# Patient Record
Sex: FEMALE | Race: WHITE
[De-identification: names, ages, dates, MRNs, and addresses within clinical notes are randomized per-mention and may not be internally consistent; named-entity substitution may affect disease eponyms.]

---

## 2018-01-09 ENCOUNTER — HOSPITAL ENCOUNTER (EMERGENCY)
Dept: HOSPITAL 17 - PHED | Age: 43
Discharge: HOME | End: 2018-01-09
Payer: COMMERCIAL

## 2018-01-09 VITALS
TEMPERATURE: 98.1 F | RESPIRATION RATE: 16 BRPM | OXYGEN SATURATION: 98 % | HEART RATE: 64 BPM | SYSTOLIC BLOOD PRESSURE: 121 MMHG | DIASTOLIC BLOOD PRESSURE: 66 MMHG

## 2018-01-09 VITALS — WEIGHT: 130.29 LBS | BODY MASS INDEX: 22.24 KG/M2 | HEIGHT: 64 IN

## 2018-01-09 VITALS — SYSTOLIC BLOOD PRESSURE: 132 MMHG | DIASTOLIC BLOOD PRESSURE: 78 MMHG

## 2018-01-09 VITALS — OXYGEN SATURATION: 98 %

## 2018-01-09 VITALS — RESPIRATION RATE: 15 BRPM

## 2018-01-09 DIAGNOSIS — K57.32: Primary | ICD-10-CM

## 2018-01-09 DIAGNOSIS — R11.0: ICD-10-CM

## 2018-01-09 DIAGNOSIS — Z86.2: ICD-10-CM

## 2018-01-09 LAB
ALBUMIN SERPL-MCNC: 3.2 GM/DL (ref 3.4–5)
ALP SERPL-CCNC: 37 U/L (ref 45–117)
ALT SERPL-CCNC: 21 U/L (ref 10–53)
AST SERPL-CCNC: 23 U/L (ref 15–37)
BASOPHILS # BLD AUTO: 0.1 TH/MM3 (ref 0–0.2)
BASOPHILS NFR BLD: 0.6 % (ref 0–2)
BILIRUB SERPL-MCNC: 0.3 MG/DL (ref 0.2–1)
BUN SERPL-MCNC: 16 MG/DL (ref 7–18)
CALCIUM SERPL-MCNC: 8.3 MG/DL (ref 8.5–10.1)
CHLORIDE SERPL-SCNC: 104 MEQ/L (ref 98–107)
COLOR UR: YELLOW
CREAT SERPL-MCNC: 0.67 MG/DL (ref 0.5–1)
EOSINOPHIL # BLD: 0.1 TH/MM3 (ref 0–0.4)
EOSINOPHIL NFR BLD: 1.5 % (ref 0–4)
ERYTHROCYTE [DISTWIDTH] IN BLOOD BY AUTOMATED COUNT: 12 % (ref 11.6–17.2)
GFR SERPLBLD BASED ON 1.73 SQ M-ARVRAT: 97 ML/MIN (ref 89–?)
GLUCOSE SERPL-MCNC: 91 MG/DL (ref 74–106)
GLUCOSE UR STRIP-MCNC: (no result) MG/DL
HCO3 BLD-SCNC: 28.4 MEQ/L (ref 21–32)
HCT VFR BLD CALC: 37.7 % (ref 35–46)
HGB BLD-MCNC: 12.6 GM/DL (ref 11.6–15.3)
HGB UR QL STRIP: (no result)
KETONES UR STRIP-MCNC: (no result) MG/DL
LEUKOCYTE ESTERASE UR QL STRIP: (no result) /HPF (ref 0–5)
LIPASE: 115 U/L (ref 73–393)
LYMPHOCYTES # BLD AUTO: 1.4 TH/MM3 (ref 1–4.8)
LYMPHOCYTES NFR BLD AUTO: 13.9 % (ref 9–44)
MCH RBC QN AUTO: 29.7 PG (ref 27–34)
MCHC RBC AUTO-ENTMCNC: 33.5 % (ref 32–36)
MCV RBC AUTO: 88.6 FL (ref 80–100)
MONOCYTE #: 0.6 TH/MM3 (ref 0–0.9)
MONOCYTES NFR BLD: 6.5 % (ref 0–8)
NEUTROPHILS # BLD AUTO: 7.7 TH/MM3 (ref 1.8–7.7)
NEUTROPHILS NFR BLD AUTO: 77.5 % (ref 16–70)
NITRITE UR QL STRIP: (no result)
PLATELET # BLD: 269 TH/MM3 (ref 150–450)
PMV BLD AUTO: 7.2 FL (ref 7–11)
PROT SERPL-MCNC: 6.4 GM/DL (ref 6.4–8.2)
RBC # BLD AUTO: 4.26 MIL/MM3 (ref 4–5.3)
SODIUM SERPL-SCNC: 138 MEQ/L (ref 136–145)
SP GR UR STRIP: 1.01 (ref 1–1.03)
SQUAMOUS #/AREA URNS HPF: (no result) /HPF (ref 0–5)
URINE LEUKOCYTE ESTERASE: (no result)
WBC # BLD AUTO: 9.9 TH/MM3 (ref 4–11)

## 2018-01-09 PROCEDURE — 80053 COMPREHEN METABOLIC PANEL: CPT

## 2018-01-09 PROCEDURE — 85025 COMPLETE CBC W/AUTO DIFF WBC: CPT

## 2018-01-09 PROCEDURE — 81001 URINALYSIS AUTO W/SCOPE: CPT

## 2018-01-09 PROCEDURE — 74177 CT ABD & PELVIS W/CONTRAST: CPT

## 2018-01-09 PROCEDURE — 96374 THER/PROPH/DIAG INJ IV PUSH: CPT

## 2018-01-09 PROCEDURE — 96375 TX/PRO/DX INJ NEW DRUG ADDON: CPT

## 2018-01-09 PROCEDURE — 96361 HYDRATE IV INFUSION ADD-ON: CPT

## 2018-01-09 PROCEDURE — 99285 EMERGENCY DEPT VISIT HI MDM: CPT

## 2018-01-09 PROCEDURE — 83690 ASSAY OF LIPASE: CPT

## 2018-01-09 PROCEDURE — 84703 CHORIONIC GONADOTROPIN ASSAY: CPT

## 2018-01-09 NOTE — PD
HPI


Chief Complaint:  GI Complaint


Time Seen by Provider:  17:41


Travel History


International Travel<30 days:  No


Contact w/Intl Traveler<30days:  No


Traveled to known affect area:  No





History of Present Illness


HPI


43yo F presented to the ED with 1 day history of abdominal pain. Pt stated that 

she started having pain last night and took a Tums. When she woke up this 

morning she was in pain that was "on fire,"  9/10, and localized to LLQ. Pt has 

a significant medical history of diverticulitis diagnosed 2-3 years ago, with 

about 5 flares up since diagnosis. Pt denies any diarrhea, vomiting, fever, 

abnormal vaginal discharge or urinary symptoms. She admits to abdominal pain in 

the LLQ and nausea. Pt denies being pregnant and states that she has an IUD and 

had her LMP recently.  





Modifying Factors: None


Associated Signs & Symptoms: Left lower quadrant abdominal pains


Risk Factors: Diverticulitis history





PFSH


Past Medical History


Anemia:  Yes


Diminished Hearing:  No


Immunizations Current:  No


Pregnant?:  Not Pregnant


:  1


Para:  1





Past Surgical History


Tonsillectomy:  Yes


Other Surgery:  Yes (LIPOSUCTION, TUMMY TUCK, BREAST AUG (2))





Social History


Alcohol Use:  Yes (weekly)


Tobacco Use:  No


Substance Use:  No





Allergies-Medications


(Allergen,Severity, Reaction):  


Coded Allergies:  


     penicillin G (Unverified  Allergy, Severe, RASH, 18)


     tramadol (Unverified  Allergy, Severe, Itching, 18)


Reported Meds & Prescriptions





Reported Meds & Active Scripts


Active


Reported


Flagyl (Metronidazole) 500 Mg Tab 500 Mg PO TID








Review of Systems


Except as stated in HPI:  all other systems reviewed are Neg


Gastrointestinal:  Positive: Nausea, Abdominal Pain





Physical Exam


Narrative


GENERAL: Well-developed middle age white female patient currently in mild 

distress.  Alert and oriented x3. 


SKIN: Warm and dry.


HEAD: Atraumatic. Normocephalic. 


NECK: Trachea midline. No JVD.  Supple.


CARDIOVASCULAR: Regular rate and rhythm.  No rubs murmurs or gallops.


RESPIRATORY: No accessory muscle use. Clear to auscultation. Breath sounds 

equal bilaterally. 


GASTROINTESTINAL: Abdomen soft. Tenderness in the LLQ without guarding or 

rebound. Active bowel sounds in all quadrants. Hepatic and splenic margins not 

palpable. 


MUSCULOSKELETAL: Extremities without clubbing, cyanosis, or edema. No obvious 

deformities. 


NEUROLOGICAL: Awake and alert. No obvious cranial nerve deficits.  Normal 

speech.


PSYCHIATRIC: Appropriate mood and affect; insight and judgment normal.





Data


Data


Last Documented VS





Vital Signs








  Date Time  Temp Pulse Resp B/P (MAP) Pulse Ox O2 Delivery O2 Flow Rate FiO2


 


18 19:08   15     


 


18 18:30     98 Room Air  


 


18 16:27 98.1 64  121/66 (84)    








Orders





 Orders


Complete Blood Count With Diff (18 17:37)


Comprehensive Metabolic Panel (18 17:37)


Lipase (18 17:37)


Urinalysis - C+S If Indicated (18 17:37)


Iv Access Insert/Monitor (18 17:37)


Ecg Monitoring (18 17:37)


Oximetry (18 17:37)


Sodium Chloride 0.9% Flush (Ns Flush) (18 17:45)


Sodium Chlor 0.9% 1000 Ml Inj (Ns 1000 M (18 18:00)


Morphine Inj (Morphine Inj) (18 18:00)


Ondansetron Inj (Zofran Inj) (18 18:00)


Ct Abd/Pel W Iv Contrast(Rout) (18 17:50)


Ed Urine Pregnancytest Poc (18 17:50)


Iohexol 350 Inj (Omnipaque 350 Inj) (18 18:51)


Ed Discharge Order (18 19:09)





Labs





Laboratory Tests








Test


  18


17:52 18


18:21


 


White Blood Count 9.9 TH/MM3  


 


Red Blood Count 4.26 MIL/MM3  


 


Hemoglobin 12.6 GM/DL  


 


Hematocrit 37.7 %  


 


Mean Corpuscular Volume 88.6 FL  


 


Mean Corpuscular Hemoglobin 29.7 PG  


 


Mean Corpuscular Hemoglobin


Concent 33.5 % 


  


 


 


Red Cell Distribution Width 12.0 %  


 


Platelet Count 269 TH/MM3  


 


Mean Platelet Volume 7.2 FL  


 


Neutrophils (%) (Auto) 77.5 %  


 


Lymphocytes (%) (Auto) 13.9 %  


 


Monocytes (%) (Auto) 6.5 %  


 


Eosinophils (%) (Auto) 1.5 %  


 


Basophils (%) (Auto) 0.6 %  


 


Neutrophils # (Auto) 7.7 TH/MM3  


 


Lymphocytes # (Auto) 1.4 TH/MM3  


 


Monocytes # (Auto) 0.6 TH/MM3  


 


Eosinophils # (Auto) 0.1 TH/MM3  


 


Basophils # (Auto) 0.1 TH/MM3  


 


CBC Comment DIFF FINAL  


 


Differential Comment   


 


Blood Urea Nitrogen 16 MG/DL  


 


Creatinine 0.67 MG/DL  


 


Random Glucose 91 MG/DL  


 


Total Protein 6.4 GM/DL  


 


Albumin 3.2 GM/DL  


 


Calcium Level 8.3 MG/DL  


 


Alkaline Phosphatase 37 U/L  


 


Aspartate Amino Transf


(AST/SGOT) 23 U/L 


  


 


 


Alanine Aminotransferase


(ALT/SGPT) 21 U/L 


  


 


 


Total Bilirubin 0.3 MG/DL  


 


Sodium Level 138 MEQ/L  


 


Potassium Level 3.8 MEQ/L  


 


Chloride Level 104 MEQ/L  


 


Carbon Dioxide Level 28.4 MEQ/L  


 


Anion Gap 6 MEQ/L  


 


Estimat Glomerular Filtration


Rate 97 ML/MIN 


  


 


 


Lipase 115 U/L  


 


Urine Collection Type  CLEAN CATCH 


 


Urine Color  YELLOW 


 


Urine Turbidity  CLEAR 


 


Urine pH  7.0 


 


Urine Specific Gravity  1.013 


 


Urine Protein  NEG mg/dL 


 


Urine Glucose (UA)  NEG mg/dL 


 


Urine Ketones  NEG mg/dL 


 


Urine Occult Blood  NEG 


 


Urine Nitrite  NEG 


 


Urine Bilirubin  NEG 


 


Urine Leukocyte Esterase  NEG 


 


Urine WBC  0-2 /hpf 


 


Urine Squamous Epithelial


Cells 


  0-5 /hpf 


 


 


Microscopic Urinalysis Comment


  


  CULT NOT


INDICATED


 


Urine Collection Time  18:21 











[facility]


Medical Decision Making


Medical Screen Exam Complete:  Yes


Emergency Medical Condition:  Yes


Medical Record Reviewed:  Yes


Interpretation(s)





Laboratory Tests








Test


  18


17:52 18


18:21


 


Neutrophils (%) (Auto)


  77.5 %


(16.0-70.0) 


 


 


Albumin


  3.2 GM/DL


(3.4-5.0) 


 


 


Calcium Level


  8.3 MG/DL


(8.5-10.1) 


 


 


Alkaline Phosphatase


  37 U/L


() 


 








Last 24 hours Impressions








Abdomen/Pelvis CT 18 1750 Signed





Impressions: 





 Service Date/Time:  2018 18:36 - CONCLUSION:  Short 

segment 





 severe colitis of the proximal sigmoid colon and potentially on the basis of 





 diverticulitis. The rest of the study is essentially normal. No abscess, 





 perforation or obstruction. A couple small cysts are noted of the right ovary 





 and there is degenerative disc disease at L5/S1.     Ramana Sierra MD 








Differential Diagnosis


Diverticulitis versus gastroenteritis versus UTI versus renal colic versus 

other acute intra-abdominal processes


Narrative Course


Lab work is essentially unremarkable.  Patient was given IV fluids and workup 

was initiated.  CAT scan is showing signs of left-sided colitis versus 

diverticulitis.  At this point, my plan would be to treat her for 

diverticulitis considering her history and have her follow-up with primary care 

doctor.  Return for worsening in symptoms as needed.  The plan was discussed 

with her and she states understanding.





Diagnosis





 Primary Impression:  


 Diverticulitis


***Med/Other Pt SpecificInfo:  Prescription(s) given


Scripts


Metronidazole (Flagyl) 500 Mg Tab


500 MG PO BID for Infection for 7 Days, #14 TAB 0 Refills


   Prov: Richardson Lubin MD         18 


Ibuprofen (Ibuprofen) 600 Mg Tab


600 MG PO Q6H Y for Pain/Inflammation, #21 TAB 0 Refills


   Prov: Richardson Lubin MD         18 


Ciprofloxacin (Cipro) 500 Mg Tab


500 MG PO BID for Infection for 7 Days, #14 TAB 0 Refills


   Prov: Richardson Lubin MD         18


Disposition:  01 DISCHARGE HOME


Condition:  Stable











Richardson Lubin MD 2018 17:50

## 2018-01-09 NOTE — RADRPT
EXAM DATE/TIME:  01/09/2018 18:36 

 

[facility] COMPARISON:     

No previous studies available for comparison.

 

 

INDICATIONS :     

***Left lower quadrant abdominal pain.

                      

 

IV CONTRAST:     

95 cc Omnipaque 350 (iohexol) IV 

 

 

ORAL CONTRAST:      

No oral contrast ingested.

                      

 

RADIATION DOSE:     

6.17 CTDIvol (mGy) 

 

 

MEDICAL HISTORY :     

None  

 

SURGICAL HISTORY :      

None. 

 

ENCOUNTER:      

Initial

 

ACUITY:      

1 day

 

PAIN SCALE:      

9/10

 

LOCATION:       

Left lower quadrant 

 

TECHNIQUE:     

Volumetric scanning of the abdomen and pelvis was performed.  Using automated exposure control and ad
justment of the mA and/or kV according to patient size, radiation dose was kept as low as reasonably 
achievable to obtain optimal diagnostic quality images.  DICOM format image data is available electro
nically for review and comparison.  

 

FINDINGS:     

 

LOWER LUNGS:     

The visualized lower lungs are clear.

 

LIVER:     

Homogeneous density without lesion.  There is no dilation of the biliary tree.  No calcified gallston
es.

 

SPLEEN:     

Normal size without lesion.

 

PANCREAS:     

Within normal limits.

 

KIDNEYS:     

Normal in size and shape.  There is no mass, stone or hydronephrosis.

 

ADRENAL GLANDS:     

Within normal limits.

 

VASCULAR:     

There is no aortic aneurysm.

 

BOWEL/MESENTERY:     

Focally severe colitis seen in the anterior aspect of the left lower quadrant, level of the proximal 
sigmoid colon. This is nonspecific, potentially on the basis of diverticulitis. No abscess, perforati
on or obstruction.

 

ABDOMINAL WALL:     

Within normal limits.

 

RETROPERITONEUM:     

There is no lymphadenopathy. 

 

BLADDER:     

No wall thickening or mass. 

 

REPRODUCTIVE:     

A couple cysts of the right ovary measuring approximately 15 mm are noted. IUD in the uterus.

 

INGUINAL:     

There is no lymphadenopathy or hernia. 

 

MUSCULOSKELETAL:     

No acute bony abnormality demonstrated. There is degenerative disc disease at L5/S1

 

CONCLUSION:     

Short segment severe colitis of the proximal sigmoid colon and potentially on the basis of diverticul
itis. The rest of the study is essentially normal. No abscess, perforation or obstruction. A couple s
mall cysts are noted of the right ovary and there is degenerative disc disease at L5/S1.

 

 

 

 Ramana Sierra MD on January 09, 2018 at 19:01           

Board Certified Radiologist.

 This report was verified electronically.